# Patient Record
(demographics unavailable — no encounter records)

---

## 2018-11-29 PROBLEM — D04.72 SQUAMOUS CELL CARCINOMA IN SITU (SCCIS) OF SKIN OF LEFT LOWER LEG: Status: ACTIVE | Noted: 2018-11-29

## 2022-01-13 NOTE — TELEPHONE ENCOUNTER
Requested Prescriptions     Pending Prescriptions Disp Refills    apixaban (ELIQUIS) 2.5 mg tablet 180 Tablet 0     Sig: Take 1 Tablet by mouth two (2) times a day.